# Patient Record
Sex: MALE | Race: BLACK OR AFRICAN AMERICAN | Employment: FULL TIME
[De-identification: names, ages, dates, MRNs, and addresses within clinical notes are randomized per-mention and may not be internally consistent; named-entity substitution may affect disease eponyms.]

---

## 2022-06-24 NOTE — FLOWSHEET NOTE
[] [address]  [address]    [phone]  F: [phone]   [x] [address]  [facility] 36   [address]  P: [phone]  F: [phone]  [] [address] &  Therapy  [address] [facility]  P: [phone]  F: [phone] [] [address]  P: [phone]  F: [phone]  [] [address]  [address] [facility] [address]   [address]   [place]: [phone]  F: [phone]   [] [address]  [place]: [phone]   F: [phone]     Physical Therapy Cancel/No Show note    Date: 2022  Patient: Caretha Koyanagi  : 1965  MRN: [id number]    Cancels/No Shows to date:     For today's appointment patient:    [x]  Cancelled    [] Rescheduled appointment    [] No-show     Reason given by patient:    []  Patient ill    [x]  Conflicting appointment    [] No transportation      [] Conflict with work    [] No reason given    [] Weather related    [] [id number]    [] Other:      Comments:  Pt unable to make appt. Rescheduled for .       [x] Next appointment was confirmed    Electronically signed by: Cheikh Amador PTA

## 2022-06-27 ENCOUNTER — HOSPITAL ENCOUNTER (OUTPATIENT)
Dept: PHYSICAL THERAPY | Facility: CLINIC | Age: 57
Setting detail: THERAPIES SERIES
Discharge: HOME | End: 2022-06-27
Payer: COMMERCIAL

## 2022-06-30 ENCOUNTER — HOSPITAL ENCOUNTER (OUTPATIENT)
Dept: PHYSICAL THERAPY | Facility: CLINIC | Age: 57
Setting detail: THERAPIES SERIES
Discharge: HOME | End: 2022-06-30
Payer: COMMERCIAL

## 2022-06-30 PROCEDURE — 97110 THERAPEUTIC EXERCISES: CPT

## 2022-06-30 PROCEDURE — 97161 PT EVAL LOW COMPLEX 20 MIN: CPT

## 2022-06-30 NOTE — CONSULTS
[] [facility]) [facility] &  Therapy  [address]  [phone]  F: [phone] [x] [address]  [facility]   [address]  P: [phone]  F: [phone] [] [address] &  Therapy  [address] [facility] [address] [facility] [address]  P: [phone]  F: [phone] [] [address]  P: [phone]  F: [phone] [] [address]  [facility]   [address]   [place]: [phone]  F: [phone]          Physical Therapy Spine Evaluation    Date:  2022  Patient: Giles Correia  : 1965  MRN: [id number]  Physician: eRid Gomes MD  Insurance: [id number] [facility] 22-22 approved 3x a week for 3 weeks)  Medical Diagnosis: S39.012A (ICD-10-CM) - Strain of muscle, fascia and tendon of lower back, initial encounter  Rehab Codes: M54.59, M25.552, M25.651, M25.652  Onset Date: 22  Next 's appt.: 22    Subjective:     Pt arrived to PT with c/o increased pain located at [address] [facility] following a fall at work when pt stepped on uneven ground while shoveling. Pt reported pain is constant, increased with transition to sitting or standing, and decreased after sitting down for a moment. Pt noted has been doing stretching exs in the pool at home, stating \"the water really help\". Pt reported 800mg Ibuprofen to reduce pain. Pt reported CP failed to relieve symptoms but noted HP (heated car seated) helped relieving symptoms. Pt stated feeling of buckling in L groin area however denied any fall in the past 6 months besides the incident. Pain present?  [x] Yes  [] No    Location  Tailbone    Pain Rating currently  (0-10 scale)   3/10   Pain at worse 8/10   Pain at best 3/10   Description of pain aching   Altered Sensation No radicular symptoms    What makes it worse Transition to sit and stand    What makes it better After sitting for a few moment   Symptom progression The same   Sleep Disturbed        Comorbidities:   [x] Obesity [] Dialysis  [x] N/A   [] Asthma/COPD [] Dementia [] Other:   [] Stroke [] Sleep apnea [] Other:   [] Vascular disease [] Rheumatic disease [] Other: PMHx: [x] Refer to full medical chart  In EPIC       [] Unremarkable [] Diabetes    [x] HTN        [] Pacemaker      [] MI/Heart Problems       [] Cancer      [] Arthritis  [] Other: [] Other:       Tests:  12/16/2021: XR HIP LT 2-3 VIEWS W OR WO PELVIS   IMPRESSION: Left hip osteoarthritis with complete loss of superior joint space. Medications: [x] Refer to full medical record                          [] None                          [] Other:    Allergies:      [x] Refer to full medical record                         [] None                         [] Other:      Function:    Patient live with: Wife   [address] of [employer] [facility] [facility] (Full time - 1st shift)   Job status [x]  Normal duty   [] Light duty   [] Off due to condition    []  Retired   [] Not employed   [] Disability  [] Other:  []  Return to work:    Work Activities/duties  --   Recreational   Activities --         ADL/IADL Previous level of function Current level of function Who currently assists the patient with task   Bathing  [x] Independent  [] Assist [x] Independent  [] Assist    Dress/grooming [x] Independent  [] Assist [x] Independent  [] Assist    Transfer/mobility [x] Independent  [] Assist [x] Independent  [] Assist    Feeding [x] Independent  [] Assist [x] Independent  [] Assist    Toileting [x] Independent  [] Assist [x] Independent  [] Assist    Driving [x] Independent  [] Assist [x] Independent  [] Assist    Housekeeping [x] Independent  [] Assist [x] Independent  [] Assist    Grocery shop/meal prep [x] Independent  [] Assist [x] Independent  [] Assist        Gait Prior level of function Current level of function    [x] Independent  [] Assist [x] Independent  [] Assist   Device: [x] Independent [x] Independent    [] Straight Cane [] Quad cane [] Straight Cane [] Quad cane    [] Standard walker [] Rolling walker   [] 4 wheeled walker [] Standard walker [] Rolling walker   [] 4 wheeled walker    [] Wheelchair [] Wheelchair            Objective:    OBSERVATION No Deficit Deficit Not Tested Comments   Posture       Forward Head [] [x] []    Rounded Shoulders [] [x] []    Kyphosis [] [x] []    Lordosis [] [x] []    Leg Length Discrp [x] [] []    Slumped Sitting [] [x] []    Palpation [] [x] [] TTP along L-sided lumbar paraspinals, L multifidus, L hip joint, and L hip flexors/quad   Sensation [x] [] []    Edema [x] [] []    Neurological [x] [] []           STRENGTH    Left Right   L1-2 Hip Flex 3/5* 5/5   Hip Abd 3+/5* 5/5   Knee Flex     L3-4 Knee Ext     L5 EHL     L4 Ankle DF     S1 Plant. Flex     Abdominals poor poor   Erector Spinae poor poor        *painful      Lumbar    Flexion wnl (+) L-sided lumbar muscle tightness   Extension 50% (+) pain/L-sided lumbar muscle tightness   Rotation L wnl (+) L-sided lumbar muscle tightness R wnl (+) L-sided lumbar muscle tightness   Sidebend L wnl (+) L-sided lumbar muscle tightness R wnl (+) L-sided lumbar muscle tightness         TESTS (+/-) LEFT RIGHT Not Tested   SLR [x] sit [] supine + + []   Hamstring (90/90 SLR) + 43 + 55 []   Ely's test  + 90 + 90 []   Slump/Dural - - []   Ai Tests ? Pain ? Pain No Change Not Tested   RFIS [] [] [x] []   TERESITA [] [] [x] []   RFIL [] [] [] [x]   REIL [] [] [] [x]         Functional Test: Oswestry Score: 26/50 or 52% functionally impaired         Assessment: 63 yo male presents to physical therapy with c/o increased pain in tailbone and L hip pain after pt slipped and fell backward at work which pain aggravated when transitioning between sit<>stand.   Pt demo reduced lumbar extension mobility, decreased flexibility of B hip flexors/quad and B HS, decreased strength of core stabilizers and L hip globally due to increased pain located in L hip joint/groin area. Pt also demo tenderness to palpation along L-sided lumbar paraspinals, L multifidus, L hip joint, and L hip flexors/quad. Pt may benefits from f/u with PCP for further assessment with L hip symptoms to r/o other conditions or rule in potential L hip OA diagnosis. Patient would benefit from skilled physical therapy services in order to: manage pain and inflammation, improve core and L hip strength, and promote lumbar and BLE mobility to assist pt in returning to prior function. Problems:    [x] ? Pain: 3-8/10 tailbone & L hip   [x] ? ROM: reduced B hip flexors/quad and B HS flexibility, lumbar ext  [x] ? Strength: core stabilizers, L hip globally  [x] ? Function: Oswestry 52% impaired    [x] Other: TTP along L-sided lumbar paraspinals, L multifidus, L hip joint, and L hip flexors/quad          LTG: (to be met in 9 treatments)  1. Pt will reduce pain to less than or equal to 4/10 with ADLs  2. Pt will be able to achieve at least 30° B 90/90 SLR to demo improved B hamstrings flexibility to ease difficulty with daily ambulation   3. Pt will improve B hip flexors/quad flexibility as evident by achieving at least 95° B/L/R Ely's to ease difficulty with stairs negotiation  4. Pt will improve L hip strength to at least 4/5 globally to reduce difficulty with sit<>stand transfers  5. Pt will improve Oswestry score to less than 42% impaired to demo improved functional mobility   6. Patient to be independent with home exercise program as demonstrated by performance with correct form without cues.   7. Demonstrate Knowledge of fall prevention      Patient goals: \"get better\"        Rehab Potential:  [] Good  [x] Fair  [] Poor   Suggested Professional Referral:  [x] No  [] Yes:  Barriers to Goal Achievement:  [x] No  [] Yes:  Domestic Concerns:  [x] No  [] Yes:      Pt. Education: [x] Plans/Goals, Risks/Benefits discussed  [x] Home exercise program  Method of Education: [x] Verbal  [x] Demo  [x] Written  Comprehension of Education:  [x] Verbalizes understanding. [x] Demonstrates understanding. [x] Needs Review. [] Demonstrates/verbalizes understanding of HEP/Ed previously given. Access Code: [email or web address]  URL: [email or web address]  Date: 06/30/2022  Prepared by:  Jessica Bueno    Exercises  Seated Hamstring Stretch - 1 x daily - 7 x weekly - 1 sets - 3 reps - 20s hold  Supine Hamstring Stretch with Strap - 1 x daily - 7 x weekly - 1 sets - 3 reps - 20s hold  Supine Lower Trunk Rotation - 1 x daily - 7 x weekly - 1 sets - 10 reps - 5s hold  Prone Quadriceps Stretch with Strap - 1 x daily - 7 x weekly - 1 sets - 3 reps - 20s hold  Supine Figure 4 Piriformis Stretch - 1 x daily - 7 x weekly - 1 sets - 3 reps - 20s hold  Hip Flexor Stretch on Step - 1 x daily - 7 x weekly - 1 sets - 3 reps - 20s hold  Sidelying Thoracic Rotation with Open Book - 1 x daily - 7 x weekly - 1 sets - 10 reps - 5s hold  Supine Transversus Abdominis Bracing - Hands on Stomach - 1 x daily - 7 x weekly - 2 sets - 10 reps - 5s hold          Treatment Plan:  [x] Therapeutic Exercise   31465  [] Iontophoresis: 4 mg/mL Dexamethasone Sodium Phosphate  mAmin  72447   [] Therapeutic Activity  00382 [] Vasopneumatic cold with compression  32680    [] Gait Training   48414 [] Ultrasound   [id number]   [] Neuromuscular Re-education  15939 [] Electrical Stimulation Unattended  69139   [x] Manual Therapy  59662 [] Electrical Stimulation Attended  13670   [x] Instruction in HEP  [] Lumbar/Cervical Traction  87758   [] Aquatic Therapy   80885 [x] Cold/hotpack    [] Massage   10779      [] Dry Needling, 1 or 2 muscles  69991   [] Biofeedback, first 15 minutes   46557  [] Biofeedback, additional 15 minutes   44050 [] Dry Needling, 3 or more muscles  43126       Frequency:  3 x/week for 9 visits        Magdalena Hernandez Treatment:  Modalities:   Precautions:  Exercises:  Exercise Reps/ Time Weight/ Level Comments         Supine       B HS stretch 2x20s ea     Abd bracing 10x5s  hooklying          Standing       B hip flexors stretch 2x20s ea 6 in                      Other:    Specific Instructions for next treatment:  - Progress core and BLE strength L>R per pt tolerance  - Stretch B hip flexors/quad, B HS, and B gastroc/soleus  - STM (manual or hypervolt) to L hip flexors/quad, L ITB, L-sided lumbar paraspinals to reduce muscle guarding/tension  - CP/HP as needed pre-/post-treatment to manage pain and inflammation         Evaluation Complexity:  History (Personal factors, comorbidities) [] 0 [x] 1-2 [] 3+   Exam (limitations, restrictions) [] 1-2 [x] 3 [] 4+   Clinical presentation (progression) [x] Stable [] Evolving  [] Unstable   Decision Making [x] Low [] Moderate [] High    [x] Low Complexity [] Moderate Complexity [] High Complexity            Treatment Charges: Mins Units Time In/Out   [x] Evaluation       [x]  Low       []  Moderate       []  High 30 1 3:10pm-3:40pm   []  Modalities        [x]  Ther Exercise 10 1 3:40pm-3:50pm   []  Neuromuscular Re-ed      []  Gait Training      []  Manual Therapy      []  Ther Activities      []  Aquatics      []  Vasocompression      []  Cervical Traction      []  Other      Total Treatment time 40 min 2 3:10pm-3:50pm        TOTAL TREATMENT TIME: 40 mins    Time in: 3:05pm      Time out: 3:50pm    Electronically signed by: An Jaunita Spatz, PT        Physician Signature:________________________________Date:__________________  By signing above or cosigning this note, I have reviewed this plan of care and certify a need for medically necessary rehabilitation services.      *PLEASE SIGN ABOVE AND FAX BACK ALL PAGES*

## 2022-07-11 ENCOUNTER — HOSPITAL ENCOUNTER (OUTPATIENT)
Dept: PHYSICAL THERAPY | Facility: CLINIC | Age: 57
Setting detail: THERAPIES SERIES
Discharge: HOME | End: 2022-07-11
Payer: COMMERCIAL

## 2022-07-11 PROCEDURE — 97140 MANUAL THERAPY 1/> REGIONS: CPT

## 2022-07-11 PROCEDURE — 97110 THERAPEUTIC EXERCISES: CPT

## 2022-07-11 NOTE — FLOWSHEET NOTE
squeezes  10x3\"     Prone prop on elbows  2'               Standing          B hip flexors stretch 3x20s ea 12 in     Slant board stretch  3x20\"       Paloff press  10xea  lime      HS curls  10xea       3 way hip  10xea     Other:     Specific Instructions for next treatment:  - Progress core and BLE strength L>R per pt tolerance  - Stretch B hip flexors/quad, B HS, and B gastroc/soleus  - STM (manual or hypervolt) to L hip flexors/quad, L ITB, L-sided lumbar paraspinals to reduce muscle guarding/tension  - CP/HP as needed pre-/post-treatment to manage pain and inflammation            Treatment Charges: Mins Units Time    []  Modalities      [x]  Ther Exercise 40   2 5:52-6:04  6:12-6:40   [x]  Manual Therapy 8 1 6:04-6:12   []  Ther Activities      []  Aquatics      []  Vasocompression      []  Other      Total Treatment time 48 3 5:52-6:40       Assessment: [x] Progressing toward goals. Initiated session with mat stretches and progressed exercise program to improve hip strength and core stability. Application of manual via hypervolt to reduce muscular tension throughout LLE. Focus on stretches to increase ms extensibility within a comfortable range with cues to avoid pain. Implemented 3 way hip and hs curls to increase B LE strength. Attempted side lying hip abd and bridges, however, caused increased pain so held these exercises. Pt notes some ms soreness and tenderness at end of session. Will monitor sx's and progress as able. [] No change. [] Other:  [x] Patient would continue to benefit from skilled physical therapy services in order to: manage pain and inflammation, improve core and L hip strength, and promote lumbar and BLE mobility to assist pt in returning to prior function. STG/LTG     Problems:    [x]? ? Pain: 3-8/10 tailbone & L hip   [x]? ? ROM: reduced B hip flexors/quad and B HS flexibility, lumbar ext  [x]? ? Strength: core stabilizers, L hip globally  [x]? ?  Function: Oswestry 52% impaired            [x]? Other: TTP along L-sided lumbar paraspinals, L multifidus, L hip joint, and L hip flexors/quad             LTG: (to be met in 9 treatments)  1. Pt will reduce pain to less than or equal to 4/10 with ADLs  2. Pt will be able to achieve at least 30° B 90/90 SLR to demo improved B hamstrings flexibility to ease difficulty with daily ambulation   3. Pt will improve B hip flexors/quad flexibility as evident by achieving at least 95° B/L/R Ely's to ease difficulty with stairs negotiation  4. Pt will improve L hip strength to at least 4/5 globally to reduce difficulty with sit<>stand transfers  5. Pt will improve Oswestry score to less than 42% impaired to demo improved functional mobility   6. Patient to be independent with home exercise program as demonstrated by performance with correct form without cues. 7. Demonstrate Knowledge of fall prevention        Patient goals: \"get better\"    Pt. Education:  [x] Yes  [] No  [x] Reviewed Prior HEP/Ed  Method of Education: [x] Verbal form [x] Demo new ex's   [] Written    Access Code: [email or web address]  URL: [employer]/  Date: 06/30/2022  Prepared by:  Jessica Mcdaniel     Exercises  Seated Hamstring Stretch - 1 x daily - 7 x weekly - 1 sets - 3 reps - 20s hold  Supine Hamstring Stretch with Strap - 1 x daily - 7 x weekly - 1 sets - 3 reps - 20s hold  Supine Lower Trunk Rotation - 1 x daily - 7 x weekly - 1 sets - 10 reps - 5s hold  Prone Quadriceps Stretch with Strap - 1 x daily - 7 x weekly - 1 sets - 3 reps - 20s hold  Supine Figure 4 Piriformis Stretch - 1 x daily - 7 x weekly - 1 sets - 3 reps - 20s hold  Hip Flexor Stretch on Step - 1 x daily - 7 x weekly - 1 sets - 3 reps - 20s hold  Sidelying Thoracic Rotation with Open Book - 1 x daily - 7 x weekly - 1 sets - 10 reps - 5s hold  Supine Transversus Abdominis Bracing - Hands on Stomach - 1 x daily - 7 x weekly - 2 sets - 10 reps - 5s hold     Comprehension of Education:  [x] Marioaya understanding. [] Demonstrates understanding. [] Needs review. [] Demonstrates/verbalizes HEP/Ed previously given. Plan: [x] Continue current frequency toward long and short term goals.     [] Specific Instructions for subsequent treatments:       Time In:   5:52   pm      Time Out: 6:40 pm    Electronically signed by:  Maninder Rocha PTA

## 2022-07-13 ENCOUNTER — HOSPITAL ENCOUNTER (OUTPATIENT)
Dept: PHYSICAL THERAPY | Facility: CLINIC | Age: 57
Setting detail: THERAPIES SERIES
Discharge: HOME | End: 2022-07-13
Payer: COMMERCIAL

## 2022-07-13 PROCEDURE — 97110 THERAPEUTIC EXERCISES: CPT

## 2022-07-13 PROCEDURE — 97140 MANUAL THERAPY 1/> REGIONS: CPT

## 2022-07-13 NOTE — FLOWSHEET NOTE
[] [address]  [address]  [phone]  F: [phone] [x] [address]  [facility] 36   [address]  P: [phone]  F: [phone] [] [address]  [address] [address]  P: [phone]  F: [phone] [] [address]  P: [phone]  F: [phone] [] [address]  [facility]   [address]   [place]: [phone]  F: [phone]      Physical Therapy Daily Treatment Note    Date:  2022  Patient Name:  Do Watts    :  1965  MRN: [id number]  Physician: Jose Lainez MD                     Insurance: [facility] 22-22 approved 3x a week for 3 weeks)  Medical Diagnosis: S39.012A (ICD-10-CM) - Strain of muscle, fascia and tendon of lower back, initial encounter  Rehab Codes: M54.59, M25.552, M25.651, M25.652  Onset Date: 22                 Next 's appt.: 22  Visit# / total visits: 3/9     Cancels/No Shows:     Subjective:    Pain:  [x] Yes  [] No Location: LBP     Pain Rating: (0-10 scale) 5/10 with sitting and resting    Pain altered Tx:  [x] No  [] Yes  Action:   Comments: Pt has increased pain when he sits and rests but states he is okay if he keeps moving.     Objective:  Modalities: Manual via hypervolt to L quad, ITB, L lumbar paraspinals in R side lying x8'  Precautions:  Exercises:  Exercise bilat  Reps/ Time Weight/ Level Comments         Seated       LAQ-alternating  10x3\"     PB roll outs  10x5\" Big red PB              Supine          B HS stretch 3x20s ea       Abd bracing 10x5s   hooklying    PPT 10x3\"     Marches + TA 10x  Popping in L LE   Single knee fallout + TA 10x     Walk outs +TrA 5x   Added    Piriformis stretch 20\"x3 ea  Added          Side lying Clamshell  15x                 Prone       Quad stretch  3x20\" Manual     glute squeezes  10x3\"     Prone prop on elbows  2'               Standing          B hip flexors stretch 3x20s ea 12 in     Slant board stretch  3x20\"       Paloff press  10xea  lime      HS curls  10xea       3 way hip  10xea     Other:     Specific Instructions for next treatment:  - Progress core and BLE strength L>R per pt tolerance  - Stretch B hip flexors/quad, B HS, and B gastroc/soleus  - STM (manual or hypervolt) to L hip flexors/quad, L ITB, L-sided lumbar paraspinals to reduce muscle guarding/tension  - CP/HP as needed pre-/post-treatment to manage pain and inflammation            Treatment Charges: Mins Units Time    []  Modalities      [x]  Ther Exercise 41   2 5:01-5:42   [x]  Manual Therapy 8 1 5:43-5:51   []  Ther Activities      []  Aquatics      []  Vasocompression      []  Other      Total Treatment time 49 3 5:01-5:51pm       Assessment: [x] Progressing toward goals. Initiated session with mat stretches again, but patient had tingling in left buttock. Added TrA walkouts this date and progressed reps with standing exercises. Also added a piriformis stretch to address tight hip musculature. Left notably tighter than the right. Patient did well but had complaints of fatigue throughout treatment which he attributed to just coming from work. Edu regarding how to protect spine at work and improve body mechanics. [] No change. [] Other:    [x] Patient would continue to benefit from skilled physical therapy services in order to: manage pain and inflammation, improve core and L hip strength, and promote lumbar and BLE mobility to assist pt in returning to prior function. STG/LTG     Problems:    [x] ? Pain: 3-8/10 tailbone & L hip   [x] ? ROM: reduced B hip flexors/quad and B HS flexibility, lumbar ext  [x] ? Strength: core stabilizers, L hip globally  [x] ?  Function: Oswestry 52% impaired            [x] Other: TTP along L-sided lumbar paraspinals, L multifidus, L hip joint, and L hip flexors/quad             LTG: (to be met in 9 treatments)  Pt will reduce pain to less than or equal to 4/10 with ADLs  Pt will be able to achieve at least 30° B 90/90 SLR to demo improved B hamstrings flexibility to ease difficulty with daily ambulation   Pt will improve B hip flexors/quad flexibility as evident by achieving at least 95° B/L/R Ely's to ease difficulty with stairs negotiation  Pt will improve L hip strength to at least 4/5 globally to reduce difficulty with sit<>stand transfers  Pt will improve Oswestry score to less than 42% impaired to demo improved functional mobility   Patient to be independent with home exercise program as demonstrated by performance with correct form without cues. Demonstrate Knowledge of fall prevention        Patient goals: \"get better\"    Pt. Education:  [x] Yes  [] No  [x] Reviewed Prior HEP/Ed  Method of Education: [x] Verbal form [x] Demo new ex's   [] Written    Access Code: [email or web address]  URL: [employer]/  Date: 06/30/2022  Prepared by: Jessica Bueno     Exercises  Seated Hamstring Stretch - 1 x daily - 7 x weekly - 1 sets - 3 reps - 20s hold  Supine Hamstring Stretch with Strap - 1 x daily - 7 x weekly - 1 sets - 3 reps - 20s hold  Supine Lower Trunk Rotation - 1 x daily - 7 x weekly - 1 sets - 10 reps - 5s hold  Prone Quadriceps Stretch with Strap - 1 x daily - 7 x weekly - 1 sets - 3 reps - 20s hold  Supine Figure 4 Piriformis Stretch - 1 x daily - 7 x weekly - 1 sets - 3 reps - 20s hold  Hip Flexor Stretch on Step - 1 x daily - 7 x weekly - 1 sets - 3 reps - 20s hold  Sidelying Thoracic Rotation with Open Book - 1 x daily - 7 x weekly - 1 sets - 10 reps - 5s hold  Supine Transversus Abdominis Bracing - Hands on Stomach - 1 x daily - 7 x weekly - 2 sets - 10 reps - 5s hold     Comprehension of Education:  [x] Verbalizes understanding. [] Demonstrates understanding.   [] Needs review. [x] Demonstrates/verbalizes HEP/Ed previously given. Plan: [x] Continue current frequency toward long and short term goals.     [x] Specific Instructions for subsequent treatments: Progress standing and core strength       Time In:   5:01   pm      Time Out: 5:51 pm    Electronically signed by:  Medina Shin PTA

## 2022-07-14 ENCOUNTER — HOSPITAL ENCOUNTER (OUTPATIENT)
Dept: PHYSICAL THERAPY | Facility: CLINIC | Age: 57
Setting detail: THERAPIES SERIES
Discharge: HOME | End: 2022-07-14
Payer: COMMERCIAL

## 2022-07-14 PROCEDURE — 97140 MANUAL THERAPY 1/> REGIONS: CPT

## 2022-07-14 PROCEDURE — 97110 THERAPEUTIC EXERCISES: CPT

## 2022-07-14 NOTE — FLOWSHEET NOTE
[] [facility]  [address]  [phone]  F: [phone] [x] [address]  [facility] 36   [address]  P: [phone]  F: [phone] [] [address]  [address] [facility]  P: [phone]  F: [phone] [] [address]  P: [phone]  F: [phone] [] [address]  [facility]   [address]   [place]: [phone]  F: [phone]      Physical Therapy Daily Treatment Note    Date:  2022  Patient Name:  Randa Velasco    :  1965  MRN: [id number]  Physician: Miguelangel Grullon MD                     Insurance: [address] [place] [address] 22-22 approved 3x a week for 3 weeks)  Medical Diagnosis: S39.012A (ICD-10-CM) - Strain of muscle, fascia and tendon of lower back, initial encounter  Rehab Codes: M54.59, M25.552, M25.651, M25.652  Onset Date: 22                 Next 's appt.: 22  Visit# / total visits:      Cancels/No Shows:     Subjective:    Pain:  [x] Yes  [] No Location: LBP     Pain Rating: (0-10 scale) 4/10 with sitting and resting    Pain altered Tx:  [x] No  [] Yes  Action:   Comments: Pt arrives noting he was sore after last therapy session but feeling better this date. Presents with reduced pain this date. Denies any N/T.      Objective:  Modalities: Manual via hypervolt to L quad, ITB, L lumbar paraspinals in R side lying x8'  Precautions:  Exercises:  Exercise bilat  Reps/ Time Weight/ Level Comments         Seated       LAQ-alternating  10x3\"     PB roll outs  10x5\"ea Big red PB Fwd, sides /             Supine          B HS stretch 3x20s ea       Abd bracing 10x5s   hooklying    PPT 10x3\"     Marches + TA 10x  Popping in L LE   Single knee fallout + TA 10x Nance  Added t band  Other:    [x] Patient would continue to benefit from skilled physical therapy services in order to: manage pain and inflammation, improve core and L hip strength, and promote lumbar and BLE mobility to assist pt in returning to prior function. STG/LTG     Problems:    [x] ? Pain: 3-8/10 tailbone & L hip   [x] ? ROM: reduced B hip flexors/quad and B HS flexibility, lumbar ext  [x] ? Strength: core stabilizers, L hip globally  [x] ? Function: Oswestry 52% impaired            [x] Other: TTP along L-sided lumbar paraspinals, L multifidus, L hip joint, and L hip flexors/quad             LTG: (to be met in 9 treatments)  1. Pt will reduce pain to less than or equal to 4/10 with ADLs  2. Pt will be able to achieve at least 30° B 90/90 SLR to demo improved B hamstrings flexibility to ease difficulty with daily ambulation   3. Pt will improve B hip flexors/quad flexibility as evident by achieving at least 95° B/L/R Ely's to ease difficulty with stairs negotiation  4. Pt will improve L hip strength to at least 4/5 globally to reduce difficulty with sit<>stand transfers  5. Pt will improve Oswestry score to less than 42% impaired to demo improved functional mobility   6. Patient to be independent with home exercise program as demonstrated by performance with correct form without cues. 7. Demonstrate Knowledge of fall prevention        Patient goals: \"get better\"    Pt. Education:  [x] Yes  [] No  [x] Reviewed Prior HEP/Ed  Method of Education: [x] Verbal form [x] Demo new ex's   [] Written    Access Code: [email or web address]  URL: [email or web address]  Date: 06/30/2022  Prepared by:  Jessica Bueno     Exercises  Seated Hamstring Stretch - 1 x daily - 7 x weekly - 1 sets - 3 reps - 20s hold  Supine Hamstring Stretch with Strap - 1 x daily - 7 x weekly - 1 sets - 3 reps - 20s hold  Supine Lower Trunk Rotation - 1 x daily - 7 x weekly - 1 sets - 10 reps - 5s hold  Prone Quadriceps Stretch with Strap - 1 x daily - 7 x weekly - 1 sets - 3 reps - 20s hold  Supine Figure 4 Piriformis Stretch - 1 x daily - 7 x weekly - 1 sets - 3 reps - 20s hold  Hip Flexor Stretch on Step - 1 x daily - 7 x weekly - 1 sets - 3 reps - 20s hold  Sidelying Thoracic Rotation with Open Book - 1 x daily - 7 x weekly - 1 sets - 10 reps - 5s hold  Supine Transversus Abdominis Bracing - Hands on Stomach - 1 x daily - 7 x weekly - 2 sets - 10 reps - 5s hold     Comprehension of Education:  [x] Verbalizes understanding. [] Demonstrates understanding. [] Needs review. [x] Demonstrates/verbalizes HEP/Ed previously given. Plan: [x] Continue current frequency toward long and short term goals.     [x] Specific Instructions for subsequent treatments: Progress standing and core strength       Time In:   5:55   pm      Time Out: 6:45 pm    Electronically signed by:  Traci Montejo PTA

## 2022-07-18 ENCOUNTER — HOSPITAL ENCOUNTER (OUTPATIENT)
Dept: PHYSICAL THERAPY | Facility: CLINIC | Age: 57
Setting detail: THERAPIES SERIES
Discharge: HOME | End: 2022-07-18
Payer: COMMERCIAL

## 2022-07-18 PROCEDURE — 97110 THERAPEUTIC EXERCISES: CPT

## 2022-07-18 NOTE — FLOWSHEET NOTE
[] [facility]  [address]  [phone]  F: [phone] [x] [address]  [facility] 36   [address]  P: [phone]  F: [phone] [] [address]  [address] [facility] [address]  P: [phone]  F: [phone] [] [address]  P: [phone]  F: [phone] [] [address]  [facility]   [address]   [place]: [phone]  F: [phone]      Physical Therapy Daily Treatment Note    Date:  2022  Patient Name:  Og Harley    :  1965  MRN: [id number]  Physician: Fariha Nolan MD                     Insurance: [facility] 22-22 approved 3x a week for 3 weeks)  Medical Diagnosis: S39.012A (ICD-10-CM) - Strain of muscle, fascia and tendon of lower back, initial encounter  Rehab Codes: M54.59, M25.552, M25.651, M25.652  Onset Date: 22                 Next 's appt.: 22  Visit# / total visits:      Cancels/No Shows:     Subjective:    Pain:  [] Yes  [x] No Location: LBP     Pain Rating: (0-10 scale) 0/10   Pain altered Tx:  [x] No  [] Yes  Action:   Comments: Pt arrives without pain just numbness in LLE. Denies tingling sensation. 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 Mentions he slept well after last therapy session and tolerated it well.      Objective:  Modalities: Manual via hypervolt to L quad, ITB, L lumbar paraspinals in R side lying x8' HELD on 22  Precautions:  Exercises:  Exercise bilat  Reps/ Time Weight/ Level Comments   True bike  5' L2 New          Seated    Not today    LAQ-alternating  10x3\"     PB roll outs  10x5\"ea Big red PB Fwd, sides  Supine          B HS stretch 3x20s ea       Abd bracing 5x5s   hooklying    PPT 10x3\"     Marches + TA 10x  Popping in L LE   Single knee fallout + TA 10x Lime   Added t band 7/14   Bridge  10x Lime   New 7/14 cues for TA and glute squeeze   Walk outs +TrA 5x2   Added 7/13; 2nd set 7/14   Piriformis stretch-fig 4 20\"x3 ea  Added 7/13   SLR 10x  New 7/18   LTR 10x  New 7/18         Side lying       Clamshell  15x     Reverse clam  10x  New 7/14   Hip abd  10x  New 7/18         Prone    Not today 7/18   Quad stretch  3x20\" Manual     glute squeezes  10x3\"     Prone prop on elbows  2'     Prone press ups  10x  New 7/14             Standing          B hip flexors stretch 3x20s ea 12 in     Slant board stretch  3x20\"       Paloff press  10x2ea Lime   added 2nd set 7/18   Paloff walk out  5x Lime  New 7/18   HS curls-alt  10xea       3 way hip + TA 10xea  TA and no UE as able 7/14   Mini squat  10x  New 7/14   Rows, extension 10xea Lime  New 7/14   Step ups fwd/lat 10xea 6\" New 7/18; no UE's         Other:     Specific Instructions for next treatment:  - Progress core and BLE strength L>R per pt tolerance  - Stretch B hip flexors/quad, B HS, and B gastroc/soleus  - STM (manual or hypervolt) to L hip flexors/quad, L ITB, L-sided lumbar paraspinals to reduce muscle guarding/tension  - CP/HP as needed pre-/post-treatment to manage pain and inflammation            Treatment Charges: Mins Units Time    []  Modalities      [x]  Ther Exercise 45   3 5:45-6:30   []  Manual Therapy      []  Ther Activities      []  Aquatics      []  Vasocompression      []  Other      Total Treatment time 45 3 5:45 pm- 6:30pm   5' on Bike not billed for 5:40-5:45    Assessment: [x] Progressing toward goals. Initiated session on recumbent bike to promote increased blood flow and in preparation for exercise program. Continued with stretches to improve ms flexibility.  Added SLR's with good tolerance and side lying hip abd needing cues for form noting slight irritation when laying on L side. Pt with increased weakness completing ex's with LLE vs RLE. Progressed standing program with addition of paloff walk outs and step ups completed forward and laterally. Continued need for cues and demo's for proper completion of squats this date. Pt with numbness and fatigue post treatment. [] No change. [] Other:    [x] Patient would continue to benefit from skilled physical therapy services in order to: manage pain and inflammation, improve core and L hip strength, and promote lumbar and BLE mobility to assist pt in returning to prior function. STG/LTG     Problems:    [x] ? Pain: 3-8/10 tailbone & L hip   [x] ? ROM: reduced B hip flexors/quad and B HS flexibility, lumbar ext  [x] ? Strength: core stabilizers, L hip globally  [x] ? Function: Oswestry 52% impaired            [x] Other: TTP along L-sided lumbar paraspinals, L multifidus, L hip joint, and L hip flexors/quad             LTG: (to be met in 9 treatments)  Pt will reduce pain to less than or equal to 4/10 with ADLs  Pt will be able to achieve at least 30° B 90/90 SLR to demo improved B hamstrings flexibility to ease difficulty with daily ambulation   Pt will improve B hip flexors/quad flexibility as evident by achieving at least 95° B/L/R Ely's to ease difficulty with stairs negotiation  Pt will improve L hip strength to at least 4/5 globally to reduce difficulty with sit<>stand transfers  Pt will improve Oswestry score to less than 42% impaired to demo improved functional mobility   Patient to be independent with home exercise program as demonstrated by performance with correct form without cues. Demonstrate Knowledge of fall prevention        Patient goals: \"get better\"    Pt. Education:  [x] Yes  [] No  [x] Reviewed Prior HEP/Ed  Method of Education: [x] Verbal positioning [x] Demo new ex's   [] Written    Access Code: [email or web address]  URL: [email or web address]  Date: 06/30/2022  Prepared by: Jessica Bueno     Exercises  Seated Hamstring Stretch - 1 x daily - 7 x weekly - 1 sets - 3 reps - 20s hold  Supine Hamstring Stretch with Strap - 1 x daily - 7 x weekly - 1 sets - 3 reps - 20s hold  Supine Lower Trunk Rotation - 1 x daily - 7 x weekly - 1 sets - 10 reps - 5s hold  Prone Quadriceps Stretch with Strap - 1 x daily - 7 x weekly - 1 sets - 3 reps - 20s hold  Supine Figure 4 Piriformis Stretch - 1 x daily - 7 x weekly - 1 sets - 3 reps - 20s hold  Hip Flexor Stretch on Step - 1 x daily - 7 x weekly - 1 sets - 3 reps - 20s hold  Sidelying Thoracic Rotation with Open Book - 1 x daily - 7 x weekly - 1 sets - 10 reps - 5s hold  Supine Transversus Abdominis Bracing - Hands on Stomach - 1 x daily - 7 x weekly - 2 sets - 10 reps - 5s hold     Comprehension of Education:  [x] Verbalizes understanding. [] Demonstrates understanding. [] Needs review. [x] Demonstrates/verbalizes HEP/Ed previously given. Plan: [x] Continue current frequency toward long and short term goals.     [x] Specific Instructions for subsequent treatments: Progress standing and core strength, update HEP      Time In:   5:40  pm    Time Out: 6:30 pm    Electronically signed by:  Bulmaro Gandhi PTA

## 2022-07-20 ENCOUNTER — APPOINTMENT (OUTPATIENT)
Dept: PHYSICAL THERAPY | Facility: CLINIC | Age: 57
End: 2022-07-20
Payer: COMMERCIAL

## 2022-07-21 ENCOUNTER — HOSPITAL ENCOUNTER (OUTPATIENT)
Dept: PHYSICAL THERAPY | Facility: CLINIC | Age: 57
Setting detail: THERAPIES SERIES
Discharge: HOME | End: 2022-07-21
Payer: COMMERCIAL

## 2022-07-21 PROCEDURE — 97110 THERAPEUTIC EXERCISES: CPT

## 2022-07-21 NOTE — FLOWSHEET NOTE
[] [facility] [facility]  [address]  [phone]  F: [phone] [x] [address]  [facility]   [address]  P: [phone]  F: [phone] [] [address]  [address] [facility]  P: [phone]  F: [phone] [] [address]  P: [phone]  F: [phone] [] [address]  [facility]   [address]   [place]: [phone]  F: [phone]      Physical Therapy Daily Treatment Note    Date:  2022  Patient Name:  Randa Velasco    :  1965  MRN: [id number]  Physician: Mgiuelangel Grullon MD                     Insurance: [address] [place] [address] 22-22 approved 3x a week for 3 weeks) extended until 22  Medical Diagnosis: S39.012A (ICD-10-CM) - Strain of muscle, fascia and tendon of lower back, initial encounter  Rehab Codes: M54.59, M25.552, M25.651, M25.652  Onset Date: 22                 Next 's appt.: 22  Visit# / total visits:      Cancels/No Shows:     Subjective:    Pain:  [] Yes  [x] No Location: LBP     Pain Rating: (0-10 scale) 0/10   Pain altered Tx:  [x] No  [] Yes  Action:   Comments: Pt arrives without pain or N/T noting he feels 'normal'. Mentions he had an easy day at work this date.      Objective:  Modalities: Manual via hypervolt to L quad, ITB, L lumbar paraspinals in R side lying x8' HELD on 22  Precautions:  Exercises: bolded completed 22    Exercise bilat  Reps/ Time Weight/ Level Comments   True bike  5' L3 New ; incr          Seated    Not today    LAQ-alternating  10x3\"     PB roll outs  10x5\"ea Big red PB Fwd, sides              Supine          B HS stretch 2x30s ea       Abd bracing 5x5s   Hooklying- not today    PPT 10x3\"     March + TA 10x2 Popping in L LE; 2nd set 7/21   Single knee fallout + TA 10x Lime   Added t band 7/14   Bridge  10x3\" Lime   New 7/14 cues for TA and glute squeeze; hold time 7/21   Walk outs +TrA 5x2   Added 7/13; 2nd set 7/14   Piriformis stretch-fig 4 30\"x2 ea  Added 7/13   SLR 10x  New 7/18   LTR 10x  New 7/18         Side lying       Clamshell  15x Lime  T band 7/21   Reverse clam  10x  New 7/14   Hip abd  10x  New 7/18         Prone       Quad stretch  3x20\" Manual     glute squeezes  10x3\"     Prone prop on elbows  2'     Prone press ups  10x  New 7/14             Standing          B hip flexors stretch 45\" ea 12 in     Slant board stretch  45\"       Paloff press  10x2ea Lime   added 2nd set 7/18   Paloff walk out  5x2 Lime  New 7/18   HS curls-alt  10xea       3 way hip + TA 10xea Lime  TA and no UE as able 7/14; t band 7/21   squats 10x2  New 7/14; cues/demos for form    Rows, extension 10x2ea Lime  New 7/14; 2ND SET 7/21   Step ups fwd/lat w/ knee drive  59ZJJ 6\" New 7/18; no UE's; added knee drive 7/57   Lat pull down  15x Lime  New 7/21   Side stepping  5x Lime; // bars  New 7/21   Alt lunges of BOSU  10xea  New 7/21         Other:     Specific Instructions for next treatment:  - Progress core and BLE strength L>R per pt tolerance  - Stretch B hip flexors/quad, B HS, and B gastroc/soleus  - STM (manual or hypervolt) to L hip flexors/quad, L ITB, L-sided lumbar paraspinals to reduce muscle guarding/tension  - CP/HP as needed pre-/post-treatment to manage pain and inflammation            Treatment Charges: Mins Units Time    []  Modalities      [x]  Ther Exercise 45   3 6:05-6:50   []  Manual Therapy      []  Ther Activities      []  Aquatics      []  Vasocompression      []  Other      Total Treatment time 45 3 6:05 pm - 6:50pm   5' on Bike not billed for 6:00-6:05    Assessment: [x] Progressing toward goals. Initiated session with increased resistance on bike followed by mat stretches.  Progressed reps for exercises with cueing as needed to ensure proper form. Added knee drive to step ups, resisted side stepping, lat pull down, and lunges onto BOSU ball. Pt with some tightness in L hip following lunges so encouraged use of ice at home. Overall tolerated progressions well. Issued updated HEP at end of session to progress home strengthening. [] No change. [] Other:    [x] Patient would continue to benefit from skilled physical therapy services in order to: manage pain and inflammation, improve core and L hip strength, and promote lumbar and BLE mobility to assist pt in returning to prior function. STG/LTG     Problems:    [x] ? Pain: 3-8/10 tailbone & L hip   [x] ? ROM: reduced B hip flexors/quad and B HS flexibility, lumbar ext  [x] ? Strength: core stabilizers, L hip globally  [x] ? Function: Oswestry 52% impaired            [x] Other: TTP along L-sided lumbar paraspinals, L multifidus, L hip joint, and L hip flexors/quad             LTG: (to be met in 9 treatments)  Pt will reduce pain to less than or equal to 4/10 with ADLs  Pt will be able to achieve at least 30° B 90/90 SLR to demo improved B hamstrings flexibility to ease difficulty with daily ambulation   Pt will improve B hip flexors/quad flexibility as evident by achieving at least 95° B/L/R Ely's to ease difficulty with stairs negotiation  Pt will improve L hip strength to at least 4/5 globally to reduce difficulty with sit<>stand transfers  Pt will improve Oswestry score to less than 42% impaired to demo improved functional mobility   Patient to be independent with home exercise program as demonstrated by performance with correct form without cues. Demonstrate Knowledge of fall prevention        Patient goals: \"get better\"    Pt. Education:  [x] Yes  [] No  [x] Reviewed Prior HEP/Ed  Method of Education: [x] Verbal positioning [x] Demo new ex's   [x] Written    Access Code: [email or web address]  URL: [email or web address]  Date: 06/30/2022  Prepared by:  Jessica Henderson Exercises  Seated Hamstring Stretch - 1 x daily - 7 x weekly - 1 sets - 3 reps - 20s hold  Supine Hamstring Stretch with Strap - 1 x daily - 7 x weekly - 1 sets - 3 reps - 20s hold  Supine Lower Trunk Rotation - 1 x daily - 7 x weekly - 1 sets - 10 reps - 5s hold  Prone Quadriceps Stretch with Strap - 1 x daily - 7 x weekly - 1 sets - 3 reps - 20s hold  Supine Figure 4 Piriformis Stretch - 1 x daily - 7 x weekly - 1 sets - 3 reps - 20s hold  Hip Flexor Stretch on Step - 1 x daily - 7 x weekly - 1 sets - 3 reps - 20s hold  Sidelying Thoracic Rotation with Open Book - 1 x daily - 7 x weekly - 1 sets - 10 reps - 5s hold  Supine Transversus Abdominis Bracing - Hands on Stomach - 1 x daily - 7 x weekly - 2 sets - 10 reps - 5s hold   Access Code: [email or web address]  URL: [email or web address]/  Date: 07/21/2022    Exercises  Seated Hamstring Stretch - 1 x daily - 7 x weekly - 1 sets - 3 reps - 20s hold  Supine Hamstring Stretch with Strap - 1 x daily - 7 x weekly - 1 sets - 3 reps - 20s hold  Supine Lower Trunk Rotation - 1 x daily - 7 x weekly - 1 sets - 10 reps - 5s hold  Prone Quadriceps Stretch with Strap - 1 x daily - 7 x weekly - 1 sets - 3 reps - 20s hold  Supine Figure 4 Piriformis Stretch - 1 x daily - 7 x weekly - 1 sets - 3 reps - 20s hold  Hip Flexor Stretch on Step - 1 x daily - 7 x weekly - 1 sets - 3 reps - 20s hold  Sidelying Thoracic Rotation with Open Book - 1 x daily - 7 x weekly - 1 sets - 10 reps - 5s hold  Supine Transversus Abdominis Bracing - Hands on Stomach - 1 x daily - 7 x weekly - 2 sets - 10 reps - 5s hold  Supine Bridge - 1 x daily - 5 x weekly - 2 sets - 10 reps  Clamshell - 1 x daily - 5 x weekly - 2 sets - 10 reps  Sidelying Hip Abduction - 1 x daily - 5 x weekly - 2 sets - 10 reps  Supine Posterior Pelvic Tilt - 1 x daily - 5 x weekly - 2 sets - 10 reps  Supine Active Straight Leg Raise - 1 x daily - 5 x weekly - 2 sets - 10 reps  Supine March - 1 x daily - 5 x weekly - 2 sets - 10 reps  Prone Press Up - 1 x daily - 5 x weekly - 2 sets - 10 reps  Standing Knee Flexion AROM with Chair Support - 1 x daily - 5 x weekly - 2 sets - 10 reps  Mini Squat - 1 x daily - 5 x weekly - 2 sets - 10 reps    Comprehension of Education:  [x] Verbalizes understanding. [] Demonstrates understanding. [] Needs review. [x] Demonstrates/verbalizes HEP/Ed previously given. Plan: [x] Continue current frequency toward long and short term goals.     [x] Specific Instructions for subsequent treatments: Progress standing and core strength      Time In:  6:00  pm    Time Out: 6:50 pm    Electronically signed by:  Jamila Chan PTA

## 2022-07-27 ENCOUNTER — HOSPITAL ENCOUNTER (OUTPATIENT)
Dept: PHYSICAL THERAPY | Facility: CLINIC | Age: 57
Setting detail: THERAPIES SERIES
Discharge: HOME | End: 2022-07-27
Payer: COMMERCIAL

## 2022-07-27 PROCEDURE — 97110 THERAPEUTIC EXERCISES: CPT

## 2022-07-27 NOTE — FLOWSHEET NOTE
[] [facility]  [address]  [phone]  F: [phone] [x] [address]  [facility] 36   [address]  P: [phone]  F: [phone] [] [place] &  Therapy  [address]  P: [phone]  F: [phone] [] [address]  P: [phone]  F: [phone] [] [address]  [facility]   [address]   [place]: [phone]  F: [phone]      Physical Therapy Daily Treatment Note    Date:  2022  Patient Name:  Ryanne Greer    :  1965  MRN: [id number]  Physician: Vidal Boone MD                     Insurance: [facility] [place] 22-22 approved 3x a week for 3 weeks) extended until 22  Medical Diagnosis: S39.012A (ICD-10-CM) - Strain of muscle, fascia and tendon of lower back, initial encounter  Rehab Codes: M54.59, M25.552, M25.651, M25.652  Onset Date: 22                 Next 's appt.: 22  Visit# / total visits:      Cancels/No Shows:     Subjective:    Pain:  [] Yes  [] No Location: LBP and L hip     Pain Rating: (0-10 scale) 5/10   Pain altered Tx:  [x] No  [] Yes  Action:   Comments: Pt arrived to physical therapy with c/o increased pain in L hip rated 5/10 and mild pain in low back not rated due 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 Pt reported has been complaint with HEP without any issue. Pt requested to be discharged early after next session due to [facility] too many things going on in my life right now\".        Objective:  Modalities: Manual via hypervolt to L quad, ITB, L lumbar paraspinals in R side lying x8' HELD on 22  Precautions:  Exercises: bolded completed 22    Exercise bilat  Reps/ Time Weight/ Level Comments   True bike  5' L4 New ; inc resistance 7/27         Seated    Not today 7/21   LAQ-alternating  10x3\"     PB roll outs  10x5\"ea Big red PB Fwd, sides 7/14             Supine          B HS stretch 2x30s ea       Abd bracing 5x5s   Hooklying- not today    PPT 10x3\"     Marches + TA 10x2 2# Popping in L LE; 2nd set 7/21; added ankle wt B/L 7/27   Single knee fallout + TA 10x Lime   Added t band 7/14   Bridge  10x3\" Lime   New 7/14 cues for TA and glute squeeze; hold time 7/21   Walk outs +TrA 5x2   Added 7/13; 2nd set 7/14   Piriformis stretch-fig 4 30\"x2 ea  Added 7/13   SLR 10x 2# New 7/18; added wt 7/27   LTR 10x3s  New 7/18; added hold time 7/27         Side lying       Clamshell  15x Blue  T band 7/21; inc resistance 7/27   Reverse clam  10x  New 7/14   Hip abd  10x  New 7/18         Prone       Quad stretch  3x20\" Manual     glute squeezes  10x3\"     Prone prop on elbows  2'     Prone press ups  10x  New 7/14             Standing          B hip flexors stretch 2x30s ea 12 in Inc hold time 7/27   Slant board stretch  1\"       Paloff press  10x2ea Blue x2  added 2nd set 7/18; inc resistance 7/27   Paloff walk out  5x2 [employer] 7/18; inc resistance 7/27   HS curls-alt  10xea       3 way hip + TA 10xea Lime  TA and no UE as able 7/14; t band 7/21   squats 10x2  New 7/14; cues/demos for form    Rows, extension 10x2ea Blue  New 7/14; 2ND SET 7/21; inc resistance 7/27   Step ups fwd/lat w/ knee drive  92TAS 6\" New 7/18; no UE's; added knee drive 1/09   Lat pull down  15x Lime  New 7/21   Side stepping 30ftx2 Lime  Inc distance & added TB 7/27   Monster walk 30ftx2 Lime  New 7/27   Alt lunges of BOSU  10xea  New 7/21         Other:     Specific Instructions for next treatment:  - Progress core and BLE strength L>R per pt tolerance  - Stretch B hip flexors/quad, B HS, and B gastroc/soleus  - STM (manual or hypervolt) to L hip flexors/quad, L ITB, L-sided lumbar paraspinals to reduce muscle guarding/tension  - CP/HP as needed pre-/post-treatment to manage pain and inflammation            Treatment Charges: Mins Units Time    []  Modalities      [x]  Ther Exercise 45   3 6:10-6:50PM   []  Manual Therapy      []  Ther Activities      []  Aquatics      []  Vasocompression      []  Other      Total Treatment time 45 3 6:10-6:50PM   5' on Bike not billed for 6:05-6:10pm    Assessment: [x] Progressing toward goals. Initiated therapy program on true bike with increased resistance to improve overall endurance and blood flow to BLE followed by stretching and strengthening exs on mat. Progressed resistance of marching + TrA, SLR, clamshell, shoulder row/ext, and paloff walkout/press to improve BLE strength and core stability. Pt reported increased pain in R anterolateral hip joint with weighed R SLR thus short rest break provided. Added theraband into side stepping as well as added monster walk with resistance to improve functional strength BLE. Pt required constant cues throughout session to perform charted exs in slow-controlled movements to improve motor recruitment and proper technique. [] No change. [] Other:    [x] Patient would continue to benefit from skilled physical therapy services in order to: manage pain and inflammation, improve core and L hip strength, and promote lumbar and BLE mobility to assist pt in returning to prior function. STG/LTG     Problems:    [x] ? Pain: 3-8/10 tailbone & L hip   [x] ? ROM: reduced B hip flexors/quad and B HS flexibility, lumbar ext  [x] ? Strength: core stabilizers, L hip globally  [x] ?  Function: Oswestry 52% impaired            [x] Other: TTP along L-sided lumbar paraspinals, L multifidus, L hip joint, and L hip flexors/quad             LTG: (to be met in 9 treatments)  Pt will reduce pain to less than or equal to 4/10 with ADLs  Pt will be able to achieve at least 30° B 90/90 SLR to demo improved B hamstrings flexibility to ease difficulty with daily ambulation   Pt will improve B hip flexors/quad - 1 x daily - 7 x weekly - 1 sets - 3 reps - 20s hold  Hip Flexor Stretch on Step - 1 x daily - 7 x weekly - 1 sets - 3 reps - 20s hold  Sidelying Thoracic Rotation with Open Book - 1 x daily - 7 x weekly - 1 sets - 10 reps - 5s hold  Supine Transversus Abdominis Bracing - Hands on Stomach - 1 x daily - 7 x weekly - 2 sets - 10 reps - 5s hold  Supine Bridge - 1 x daily - 5 x weekly - 2 sets - 10 reps  Clamshell - 1 x daily - 5 x weekly - 2 sets - 10 reps  Sidelying Hip Abduction - 1 x daily - 5 x weekly - 2 sets - 10 reps  Supine Posterior Pelvic Tilt - 1 x daily - 5 x weekly - 2 sets - 10 reps  Supine Active Straight Leg Raise - 1 x daily - 5 x weekly - 2 sets - 10 reps  Supine March - 1 x daily - 5 x weekly - 2 sets - 10 reps  Prone Press Up - 1 x daily - 5 x weekly - 2 sets - 10 reps  Standing Knee Flexion AROM with Chair Support - 1 x daily - 5 x weekly - 2 sets - 10 reps  Mini Squat - 1 x daily - 5 x weekly - 2 sets - 10 reps    Comprehension of Education:  [x] Verbalizes understanding. [] Demonstrates understanding. [] Needs review. [x] Demonstrates/verbalizes HEP/Ed previously given. Plan: [x] Continue current frequency toward long and short term goals. [x] Specific Instructions for subsequent treatments: Progress standing and core strength      Time In:  6:05 pm    Time Out: 6:50 pm    Electronically signed by:   Jessica Dorman, PT

## 2022-08-01 ENCOUNTER — HOSPITAL ENCOUNTER (OUTPATIENT)
Dept: PHYSICAL THERAPY | Facility: CLINIC | Age: 57
Setting detail: THERAPIES SERIES
Discharge: HOME | End: 2022-08-01
Payer: COMMERCIAL

## 2022-08-01 PROCEDURE — 97110 THERAPEUTIC EXERCISES: CPT

## 2022-08-01 NOTE — DISCHARGE SUMMARY
[] [facility]) [facility] &  Therapy  [address]  [phone]  F: [phone] [x] [address]  [facility]"" 36   [address]  P: [phone]  F: [phone] [] [address]  Therapy  [address]  P: [phone]  F: [phone] [] [address]  P: [phone]  F: [phone] [] [address]  [address] [facility]   [address]   [place]: [phone]  F: [phone]      Physical Therapy Discharge Note    Date: 2022      Patient: Aubree Parry  : 1965  MRN: [id number]    Physician: Darinel Ovalles MD                     Insurance: [facility] [employer] 22-22 approved 3x a week for 3 weeks) extended until 22  Medical Diagnosis: S39.012A (ICD-10-CM) - Strain of muscle, fascia and tendon of lower back, initial encounter  Rehab Codes: M54.59, M25.552, M25.651, M25.652  Onset Date: 22                 Next 's appt.: 22  Visit# / total visits: 8/9                                    Cancels/No Shows: 0/0  Date of initial visit: 22               Date of final visit: 22         Subjective:    Pain:  [] Yes  [x] No   Location: LBP and L hip             Pain Rating: (0-10 scale) 0/10  Pain altered Tx:  [x] No  [] Yes  Action:  Comments: Pt arrived to physical therapy from work 15 min late but able to accommodate. Pt with c/o increased muscle soreness from walking all day at [facility] with family and being at work all day today. Pt reported no sharp pain noted in low back or hip this date. Pt verbalized ready to be discharged from PT as pt stated \"I have other obligations\".         Objective: Re-assessed on 22 by An Derral Patient, PT            STRENGTH     Left Right   L1-2 Hip Flex 4+/5* 5/5   Hip Abd 4/5* 5/5   *painful 90/90 SLR: R 40 , L 30  Ely's test: R 110, L 110           Functional Test: Oswestry Score: 26/50 or 52% functionally impaired  Updated 8/1/22: 9/50 or 18% functionally impaired             Assessment:  [x] Progressing toward goals. Initiated therapy session with stretching exs on mat followed by re-assessment to check all goals and overall function. Pt is progressing well with pain level as pt reported low back pain and L hip pain at worse is at 4/10 unless using the sweeper at work with pain will be up to 7/10 however stated \"it's much better than it was before\". Pt is progressing well with B hamstrings and B hip flexors/quad flexibility, improved L hip strength globally despite c/o increased pain in L groin area due to several L hip OA symptoms. Oswestry score improved from 26/50 to 9/50 which is 18% impaired. Updated and reviewed HEP with added variations of lunges, lateral band walk, monster walk, lat pull down, step up with knee drive, and paloff press and educated pt on benefits and importance of cont with HEP at a consistent manner after discharged to prevent regression. Pt verbalized understanding. Pt have met most goals and is progressing well with overall function. Pt is ready to be discharged from physical therapy services and will complete HEP independently. [] No change. [] Other:                          [x] Patient would continue to benefit from skilled physical therapy services in order to: manage pain and inflammation, improve core and L hip strength, and promote lumbar and BLE mobility to assist pt in returning to prior function. Problems:    [x] ? Pain: 3-8/10 tailbone & L hip   [x] ? ROM: reduced B hip flexors/quad and B HS flexibility, lumbar ext  [x] ? Strength: core stabilizers, L hip globally  [x] ?  Function: Oswestry 52% impaired            [x] Other: TTP along L-sided lumbar paraspinals, L multifidus, L hip joint, and L hip flexors/quad             LTG: (to be met in 9 treatments) - Re-assessed on 8/1/22 by An Titi Black, PT  Pt will reduce pain to less than or equal to 4/10 with ADLs - Progressing (worse at 4/10 with ADLs, up to 7/10 if working on sweeper, pt stated \"it's not as bad as it used to be\")  Pt will be able to achieve at least 30° B 90/90 SLR to demo improved B hamstrings flexibility to ease difficulty with daily ambulation - Progressing (L 30, R 40)  Pt will improve B hip flexors/quad flexibility as evident by achieving at least 95° B Ely's to ease difficulty with stairs negotiation - MET (L 110, R 110)  Pt will improve L hip strength to at least 4/5 globally to reduce difficulty with sit<>stand transfers - MET (L hip flexors 4+/5, L hip abductors 4/5)  Pt will improve Oswestry score to less than 42% impaired to demo improved functional mobility - MET (18% impaired)  Patient to be independent with home exercise program as demonstrated by performance with correct form without cues. - MET  Demonstrate Knowledge of fall prevention - MET        Patient goals: \"get better\" - MET         Treatment to Date:  [x] Therapeutic Exercise    [] Modalities:  [] Therapeutic Activity    [] Ultrasound  [] Electrical Stimulation  [] Gait Training     [] Massage       [] Lumbar/Cervical Traction  [] Neuromuscular Re-education [] Cold/hotpack [] Iontophoresis: 4 mg/mL  [x] Instruction in Home Exercise Program                     Dexamethasone Sodium  [x] Manual Therapy             Phosphate 40-80 mAmin  [] Aquatic Therapy                   [] Vasocompression/    [] Other:             Game Ready    Discharge Status:     [x] Pt recovered from conditions. Treatment goals were met. [x] Pt received maximum benefit. No further therapy indicated at this time. [x] Pt to continue exercise/home instructions independently.            Electronically signed by An Titi Black PT on 8/1/2022 at 5:53 PM      If you have any questions or concerns, please don't hesitate to call.   Thank you for your referral.

## 2022-08-01 NOTE — FLOWSHEET NOTE
[] [facility] [address]  [address]  [phone]  F: [phone] [x] [address]  [facility]   [address]  P: [phone]  F: [phone] [] [address]  [address]  P: [phone]  F: [phone] [] [address]  P: [phone]  F: [phone] [] [address]  [facility]   [address]   [place]: [phone]  F: [phone]      Physical Therapy Daily Treatment Note    Date:  2022  Patient Name:  Luis Miguel Verde    :  1965  MRN: [id number]  Physician: Aliza Morgan MD                     Insurance: [employer] Iliana Roys 22-22 approved 3x a week for 3 weeks) extended until 22  Medical Diagnosis: S39.012A (ICD-10-CM) - Strain of muscle, fascia and tendon of lower back, initial encounter  Rehab Codes: M54.59, M25.552, M25.651, M25.652  Onset Date: 22                 Next 's appt.: 22  Visit# / total visits: 8/9     Cancels/No Shows: 0/0    Subjective:    Pain:  [] Yes  [x] No Location: LBP and L hip     Pain Rating: (0-10 scale) 0/10   Pain altered Tx:  [x] No  [] Yes  Action:   Comments: Pt arrived to physical therapy from work 15 min late but able to accommodate. Pt with c/o increased muscle soreness from walking all day at [facility] with family and being at work all day today. Pt reported no sharp pain noted in low back or hip this date. Pt verbalized ready to be discharged from PT as pt stated \"I have other obligations\".        Objective:  Modalities: Manual via hypervolt to L quad, ITB, L lumbar paraspinals in R side lying x8' HELD on 22  Precautions:  Exercises: bolded completed 22    Exercise bilat  Reps/ Time Weight/ Level Comments   True bike  5' L4 New ; inc resistance 7/27         Seated    Not today 7/21   LAQ-alternating  10x3\"     PB roll outs  10x5\"ea Big red PB Fwd, sides 7/14             Supine          B HS stretch 1' [address] hold time 8/1   Abd bracing 5x5s   Hooklying- not today    PPT 10x3\"     Marches + TA 10x2 2# Popping in L LE; 2nd set 7/21; added ankle wt B/L 7/27   Single knee fallout + TA 10x Lime   Added t band 7/14   Bridge  10x3\" Lime   New 7/14 cues for TA and glute squeeze; hold time 7/21   Walk outs +TrA 5x2   Added 7/13; 2nd set 7/14   Piriformis stretch-fig 4 30\"x2 ea  Added 7/13   SLR 10x 2# New 7/18; added wt 7/27   LTR 10x3s  New 7/18; added hold time 7/27         Side lying       Clamshell  15x Blue  T band 7/21; inc resistance 7/27   Reverse clam  10x  New 7/14   Hip abd  10x  New 7/18         Prone       Quad stretch  1' ea strap Inc time 8/1   glute squeezes  10x3\"     Prone prop on elbows  2'     Prone press ups  10x  New 7/14             Standing          B hip flexors stretch 2x30s ea 12 in Inc hold time 7/27   Slant board stretch  1\"       Paloff press  10x2ea Blue x2  added 2nd set 7/18; inc resistance 7/27   Paloff walk out  5x2 [employer] 7/18; inc resistance 7/27   HS curls-alt  10xea       3 way hip + TA 10xea Lime  TA and no UE as able 7/14; t band 7/21   squats 10x2  New 7/14; cues/demos for form    Rows, extension 10x2ea Blue  New 7/14; 2ND SET 7/21; inc resistance 7/27   Step ups fwd/lat w/ knee drive  67YLU 6\" New 7/18; no UE's; added knee drive 8/53   Lat pull down  15x Lime  New 7/21   Side stepping 30ftx2 [address] distance & added TB 7/27; inc resistance 8/1   Monster walk fwd/back 30ftx2 [employer]  resistance, added backward direction 8/1   Alt lunges of BOSU  10xea  New 7/21         Other:     Specific Instructions for next treatment:  - Progress core and BLE strength L>R per pt tolerance  - Stretch B hip flexors/quad, B HS, and B gastroc/soleus  - STM (manual or hypervolt) to L hip flexors/quad, L ITB, L-sided lumbar paraspinals to reduce muscle guarding/tension  - CP/HP as needed pre-/post-treatment to manage pain and inflammation            Treatment Charges: Mins Units Time    []  Modalities      [x]  Ther Exercise 35 2 5:15pm-5:50pm   []  Manual Therapy      []  Ther Activities      []  Aquatics      []  Vasocompression      []  Other      Total Treatment time 35 2 5:15pm-5:50pm   Re-assessment time is billed under therex this date 8/1/22       Objective: Re-assessed on 8/1/22 by An Joeral Patient, PT       STRENGTH     Left Right   L1-2 Hip Flex 4+/5* 5/5   Hip Abd 4/5* 5/5   *painful     90/90 SLR: R 40 , L 30  Ely's test: R 110, L 110         Functional Test: Oswestry Score: 26/50 or 52% functionally impaired  Updated 8/1/22: 9/50 or 18% functionally impaired           Assessment: [x] Progressing toward goals. Initiated therapy session with stretching exs on mat followed by re-assessment to check all goals and overall function. Pt is progressing well with pain level as pt reported low back pain and L hip pain at worse is at 4/10 unless using the sweeper at work with pain will be up to 7/10 however stated \"it's much better than it was before\". Pt is progressing well with B hamstrings and B hip flexors/quad flexibility, improved L hip strength globally despite c/o increased pain in L groin area due to several L hip OA symptoms. Oswestry score improved from 26/50 to 9/50 which is 18% impaired. Updated and reviewed HEP with added variations of lunges, lateral band walk, monster walk, lat pull down, step up with knee drive, and paloff press and educated pt on benefits and importance of cont with HEP at a consistent manner after discharged to prevent regression. Pt verbalized understanding. Pt have met most goals and is progressing well with overall function. Pt is ready to be discharged from physical therapy services and will complete HEP independently. [] No change.      [] Other:    [x] Patient would continue to benefit from skilled physical therapy services in order to: manage pain and inflammation, improve core and L hip strength, and promote lumbar and BLE mobility to assist pt in returning to prior function. Problems:    [x] ? Pain: 3-8/10 tailbone & L hip   [x] ? ROM: reduced B hip flexors/quad and B HS flexibility, lumbar ext  [x] ? Strength: core stabilizers, L hip globally  [x] ? Function: Oswestry 52% impaired            [x] Other: TTP along L-sided lumbar paraspinals, L multifidus, L hip joint, and L hip flexors/quad             LTG: (to be met in 9 treatments) - Re-assessed on 8/1/22 by An Lakesha Gardner, PT  Pt will reduce pain to less than or equal to 4/10 with ADLs - Progressing (worse at 4/10 with ADLs, up to 7/10 if working on sweeper, pt stated \"it's not as bad as it used to be\")   Pt will be able to achieve at least 30° B 90/90 SLR to demo improved B hamstrings flexibility to ease difficulty with daily ambulation - Progressing (L 30, R 40)  Pt will improve B hip flexors/quad flexibility as evident by achieving at least 95° B Ely's to ease difficulty with stairs negotiation - MET (L 110, R 110)  Pt will improve L hip strength to at least 4/5 globally to reduce difficulty with sit<>stand transfers - MET (L hip flexors 4+/5, L hip abductors 4/5)  Pt will improve Oswestry score to less than 42% impaired to demo improved functional mobility - MET (18% impaired)  Patient to be independent with home exercise program as demonstrated by performance with correct form without cues. - MET  Demonstrate Knowledge of fall prevention - MET        Patient goals: \"get better\"    Pt. Education:  [x] Yes  [] No  [x] Reviewed Prior HEP/Ed  Method of Education: [x] Verbal positioning [x] Demo new ex's   [x] Written    Access Code: [email or web address]  URL: [email or web address]  Date: 08/01/2022  Prepared by:  Jessica Bueno    Exercises  Seated Hamstring Stretch - 1 x daily - 7 x weekly - 1 sets - 3 reps - 20s hold  Supine Hamstring Stretch with Strap - 1 x daily - 7 x weekly - 1 sets - 3 reps - 20s hold  Supine Lower Trunk Rotation - 1 x daily - 7 x weekly - 1 sets - 10 reps - 5s hold  Prone Quadriceps Stretch with Strap - 1 x daily - 7 x weekly - 1 sets - 3 reps - 20s hold  Supine Figure 4 Piriformis Stretch - 1 x daily - 7 x weekly - 1 sets - 3 reps - 20s hold  Hip Flexor Stretch on Step - 1 x daily - 7 x weekly - 1 sets - 3 reps - 20s hold  Sidelying Thoracic Rotation with Open Book - 1 x daily - 7 x weekly - 1 sets - 10 reps - 5s hold  Supine Transversus Abdominis Bracing - Hands on Stomach - 1 x daily - 7 x weekly - 2 sets - 10 reps - 5s hold  Supine Bridge - 1 x daily - 5 x weekly - 2 sets - 10 reps  Clamshell - 1 x daily - 5 x weekly - 2 sets - 10 reps  Sidelying Hip Abduction - 1 x daily - 5 x weekly - 2 sets - 10 reps  Supine Posterior Pelvic Tilt - 1 x daily - 5 x weekly - 2 sets - 10 reps  Supine Active Straight Leg Raise - 1 x daily - 5 x weekly - 2 sets - 10 reps  Supine March - 1 x daily - 5 x weekly - 2 sets - 10 reps  Prone Press Up - 1 x daily - 5 x weekly - 2 sets - 10 reps  Standing Knee Flexion AROM with Chair Support - 1 x daily - 5 x weekly - 2 sets - 10 reps  Mini Squat - 1 x daily - 5 x weekly - 2 sets - 10 reps  Forward-Side Diagonal Fall Out Lunge - 1 x daily - 7 x weekly - 2 sets - 10 reps  Forward Fall Out Lunge - 1 x daily - 7 x weekly - 2 sets - 10 reps  Reverse Lunge - 1 x daily - 7 x weekly - 2 sets - 10 reps  Side Stepping with Resistance at Thighs - 1 x daily - 7 x weekly - 2 sets - 10 reps  Backward Monster Walks - 1 x daily - 7 x weekly - 2 sets - 10 reps  Forward Monster Walks - 1 x daily - 7 x weekly - 2 sets - 10 reps  Standing Lat Pull Down with Resistance - Elbows Bent - 1 x daily - 7 x weekly - 2 sets - 10 reps  Crossover Step Up with Knee Drive - 1 x daily - 7 x weekly - 2 sets - 10 reps  Crossover Step Up with Knee Drive - 1 x daily - 7 x weekly - 2 sets - 10 reps      Comprehension of Education:  [x] Verbalizes understanding. [] Demonstrates understanding. [] Needs review. [x] Demonstrates/verbalizes HEP/Ed previously given. Plan: [x] Continue current frequency toward long and short term goals. [x] Specific Instructions for subsequent treatments: Progress standing and core strength      Time In: 5:15 pm    Time Out: 5:50 pm    Electronically signed by:   Jessica Beasley PT